# Patient Record
Sex: MALE | URBAN - METROPOLITAN AREA
[De-identification: names, ages, dates, MRNs, and addresses within clinical notes are randomized per-mention and may not be internally consistent; named-entity substitution may affect disease eponyms.]

---

## 2023-06-06 ENCOUNTER — APPOINTMENT (RX ONLY)
Dept: URBAN - METROPOLITAN AREA CLINIC 41 | Facility: CLINIC | Age: 53
Setting detail: DERMATOLOGY
End: 2023-06-06

## 2023-06-06 DIAGNOSIS — Z00.00 ENCOUNTER FOR GENERAL ADULT MEDICAL EXAMINATION WITHOUT ABNORMAL FINDINGS: ICD-10-CM

## 2023-06-06 PROCEDURE — ? COUNSELING

## 2023-06-06 PROCEDURE — ? PRESCRIPTION MEDICATION MANAGEMENT

## 2023-06-06 PROCEDURE — ? OTHER

## 2023-06-06 PROCEDURE — 99202 OFFICE O/P NEW SF 15 MIN: CPT

## 2023-06-06 ASSESSMENT — LOCATION DETAILED DESCRIPTION DERM: LOCATION DETAILED: LEFT BUTTOCK

## 2023-06-06 ASSESSMENT — LOCATION ZONE DERM: LOCATION ZONE: TRUNK

## 2023-06-06 ASSESSMENT — LOCATION SIMPLE DESCRIPTION DERM: LOCATION SIMPLE: LEFT BUTTOCK

## 2023-06-06 NOTE — PROCEDURE: PRESCRIPTION MEDICATION MANAGEMENT
Detail Level: Zone
Plan: No visible rash on exam today therefore no treatment is needed at this time. Patient reports taking terbinafine for 2 months and has 1 month left of treatment then will discontinue.\\nRecommended patient call office if area flares up after stopping terbinafine
Continue Regimen: Terbinafine (as directed by provider treating reported onychomycosis)
Render In Strict Bullet Format?: No

## 2023-06-06 NOTE — PROCEDURE: OTHER
Render Risk Assessment In Note?: no
Other (Free Text): There is no visible rash present today on exam. Patient reports h/o a recurring rash on his left buttock x4 years that is made worse with TAC 0.1 cream and improved/now resolved while on terbinafine (for onychomycosis). Recommended patient call for an appointment if rash recurs
Note Text (......Xxx Chief Complaint.): This diagnosis correlates with the
Detail Level: Zone